# Patient Record
Sex: FEMALE | URBAN - METROPOLITAN AREA
[De-identification: names, ages, dates, MRNs, and addresses within clinical notes are randomized per-mention and may not be internally consistent; named-entity substitution may affect disease eponyms.]

---

## 2022-10-21 ENCOUNTER — APPOINTMENT (RX ONLY)
Dept: URBAN - METROPOLITAN AREA CLINIC 151 | Facility: CLINIC | Age: 37
Setting detail: DERMATOLOGY
End: 2022-10-21

## 2022-10-21 DIAGNOSIS — L30.1 DYSHIDROSIS [POMPHOLYX]: ICD-10-CM | Status: INADEQUATELY CONTROLLED

## 2022-10-21 DIAGNOSIS — L42 PITYRIASIS ROSEA: ICD-10-CM | Status: INADEQUATELY CONTROLLED

## 2022-10-21 PROCEDURE — ? COUNSELING

## 2022-10-21 PROCEDURE — 99204 OFFICE O/P NEW MOD 45 MIN: CPT

## 2022-10-21 PROCEDURE — ? DIAGNOSIS COMMENT

## 2022-10-21 PROCEDURE — ? PRESCRIPTION

## 2022-10-21 RX ORDER — TRIAMCINOLONE ACETONIDE 1 MG/G
OINTMENT TOPICAL
Qty: 454 | Refills: 4 | Status: ERX | COMMUNITY
Start: 2022-10-21

## 2022-10-21 RX ADMIN — TRIAMCINOLONE ACETONIDE: 1 OINTMENT TOPICAL at 00:00

## 2022-10-21 NOTE — PROCEDURE: MIPS QUALITY
Quality 226: Preventive Care And Screening: Tobacco Use: Screening And Cessation Intervention: Patient screened for tobacco use and is an ex/non-smoker
Detail Level: Detailed
Quality 110: Preventive Care And Screening: Influenza Immunization: Influenza immunization was not ordered or administered, reason not given
Quality 431: Preventive Care And Screening: Unhealthy Alcohol Use - Screening: Patient not identified as an unhealthy alcohol user when screened for unhealthy alcohol use using a systematic screening method
Quality 402: Tobacco Use And Help With Quitting Among Adolescents: Patient screened for tobacco and never smoked

## 2022-10-21 NOTE — PROCEDURE: DIAGNOSIS COMMENT
Render Risk Assessment In Note?: no
Detail Level: Detailed
Comment: Has been occurring more frequently since she gave birth 1.5 years ago. Very itchy. Does not have a good treatment for it when it occurs.

## 2024-08-10 NOTE — HPI: OTHER
Condition:: Rash under R and L breasts
Per Dr. Laar, please keep patient on schedule and continue to call for PAT. MD aware of multiple call attempts.   
Please Describe Your Condition:: Pt presents with rash under R and L breasts. Pt notes that her daughter had hand, foot, and mouth a few days before she noticed the rash. Started as a few spots on the breast then spread down her abdomen days later. Denies personal symptoms of illness. Pt denies itchiness and occurrence of rash on the face. Pt notes that she’s currently using ringworm ointment and head and shoulders wash.
Condition:: Bumps on fingers
Please Describe Your Condition:: Pt reports concern of bumps on fingers. Pt denies history of asthma, hay fever, and eczema during childhood. Says started after she gave birth to her daughter 1.5 years ago. They are very itchy. No clear triggers.